# Patient Record
Sex: MALE | Race: WHITE | NOT HISPANIC OR LATINO | URBAN - METROPOLITAN AREA
[De-identification: names, ages, dates, MRNs, and addresses within clinical notes are randomized per-mention and may not be internally consistent; named-entity substitution may affect disease eponyms.]

---

## 2019-01-13 ENCOUNTER — HOSPITAL ENCOUNTER (EMERGENCY)
Facility: CLINIC | Age: 29
Discharge: HOME | End: 2019-01-13
Attending: EMERGENCY MEDICINE | Admitting: EMERGENCY MEDICINE
Payer: OTHER MISCELLANEOUS

## 2019-01-13 VITALS
TEMPERATURE: 97.8 F | RESPIRATION RATE: 16 BRPM | OXYGEN SATURATION: 97 % | SYSTOLIC BLOOD PRESSURE: 150 MMHG | DIASTOLIC BLOOD PRESSURE: 100 MMHG | WEIGHT: 280 LBS | BODY MASS INDEX: 34.82 KG/M2 | HEIGHT: 75 IN

## 2019-01-13 DIAGNOSIS — S61.213A LACERATION OF LEFT MIDDLE FINGER WITHOUT FOREIGN BODY WITHOUT DAMAGE TO NAIL, INITIAL ENCOUNTER: ICD-10-CM

## 2019-01-13 PROCEDURE — 99283 EMERGENCY DEPT VISIT LOW MDM: CPT | Mod: 25

## 2019-01-13 PROCEDURE — 12001 RPR S/N/AX/GEN/TRNK 2.5CM/<: CPT

## 2019-01-13 PROCEDURE — 25000128 H RX IP 250 OP 636: Performed by: EMERGENCY MEDICINE

## 2019-01-13 PROCEDURE — 90471 IMMUNIZATION ADMIN: CPT

## 2019-01-13 PROCEDURE — 90715 TDAP VACCINE 7 YRS/> IM: CPT | Performed by: EMERGENCY MEDICINE

## 2019-01-13 RX ADMIN — CLOSTRIDIUM TETANI TOXOID ANTIGEN (FORMALDEHYDE INACTIVATED), CORYNEBACTERIUM DIPHTHERIAE TOXOID ANTIGEN (FORMALDEHYDE INACTIVATED), BORDETELLA PERTUSSIS TOXOID ANTIGEN (GLUTARALDEHYDE INACTIVATED), BORDETELLA PERTUSSIS FILAMENTOUS HEMAGGLUTININ ANTIGEN (FORMALDEHYDE INACTIVATED), BORDETELLA PERTUSSIS PERTACTIN ANTIGEN, AND BORDETELLA PERTUSSIS FIMBRIAE 2/3 ANTIGEN 0.5 ML: 5; 2; 2.5; 5; 3; 5 INJECTION, SUSPENSION INTRAMUSCULAR at 11:11

## 2019-01-13 ASSESSMENT — ENCOUNTER SYMPTOMS
NUMBNESS: 0
WOUND: 1

## 2019-01-13 ASSESSMENT — MIFFLIN-ST. JEOR: SCORE: 2330.7

## 2019-01-13 NOTE — ED PROVIDER NOTES
"  History     Chief Complaint:  Laceration    HPI   Paul Mendoza is a 28 year old male who presents with a finger laceration. The patient was at work cutting [employer] when he cut his left middle finger with a knife. He is right handed. He states that he has sensation and normal function of the finger. He denies any other injuries. His last tetanus was in 2009.     Allergies:  No known drug allergies.    Medications:    The patient is currently on no regular medications.     Past Medical History:    History reviewed.  No significant past medical history.     Past Surgical History:    History reviewed. No pertinent past surgical history.    Family History:    History reviewed. No pertinent family history.    Social History:  Presents to ED alone  PCP: No primary care provider on file.     Review of Systems   Skin: Positive for wound.   Neurological: Negative for numbness.   All other systems reviewed and are negative.    Physical Exam   First Vitals:  Patient Vitals for the past 24 hrs:   BP Temp Temp src Heart Rate Resp SpO2 Height Weight   01/13/19 1058 (!) 150/100 97.8  F (36.6  C) Oral 81 16 97 % 1.905 m (6' 3\") 127 kg (280 lb)     Physical Exam  Nursing note and vitals reviewed.  Constitutional:  Oriented to person, place, and time. Cooperative.   HENT:   Nose:    Nose normal.   Mouth/Throat:   Mucous membranes are normal.   Eyes:    Conjunctivae normal and EOM are normal.      Pupils are equal, round, and reactive to light.   Neck:    Trachea normal.   Cardiovascular:  Normal rate, regular rhythm, normal heart sounds and normal pulses. No murmur heard.  Pulmonary/Chest:  Effort normal and breath sounds normal.   Abdominal:   Soft. Normal appearance and bowel sounds are normal.      There is no tenderness.      There is no rebound and no CVA tenderness.   Musculoskeletal:  Extremities atraumatic x 4.   Lymphadenopathy:  No cervical adenopathy.   Neurological:   Alert and oriented to person, place, and " time. Normal strength.      No cranial nerve deficit or sensory deficit. GCS eye subscore is 4. GCS verbal subscore is 5. GCS motor subscore is 6.   Skin:    2 cm Laceration to distal phalanx of the left middle finger on the radial aspect of the finger going up to the lateral aspect of the nail. No rash noted.   Psychiatric:   Normal mood and affect.    Emergency Department Course     Procedures:     Laceration Repair      LACERATION:  A simple clean 2 cm laceration.    LOCATION:  Distal phalanx of the left middle finger on the radial aspect of the finger going up to the lateral aspect of the nail.    FUNCTION:  Distally sensation, circulation, motor and tendon function are intact.    ANESTHESIA:  Local using 0.5% bupivacaine.    PREPARATION:  Irrigation with Normal Saline.    DEBRIDEMENT:  wound explored, no foreign body found.    CLOSURE:  Wound was closed with One Layer.  Skin closed with 6 x 5.0 Ethylon using interrupted sutures..    Interventions:  1111: Adacel 0.5mL IV    Emergency Department Course:  10:55 AM Nursing notes and vitals reviewed.  I performed an exam of the patient as documented above.   11:31 AM Findings and plan explained to the patient. Patient discharged home with instructions regarding supportive care, medications, and reasons to return. The importance of close follow-up was reviewed.     Impression & Plan      Medical Decision Making:  The patient presented with a simple finger laceration.  The wound was carefully evaluated and explored.  The laceration was closed with sutures as noted above.  There is no evidence of muscular, tendon, or bony damage with this laceration.  No signs of foreign body.  Possible complications (infection, scarring) were reviewed with the patient.  Patient is to return to the ER in 10-14 days for suture removal.  Tetanus and pertussis status updated today.    Diagnosis:    ICD-10-CM    1. 2 cm Laceration of distal phalanx of left middle finger without foreign  body without damage to nail, initial encounter S61.213A     6 sutures       Disposition:  discharged to home    I, Bradley Aasen, am serving as a scribe on 1/13/2019 at 10:55 AM to personally document services performed by Aneudy Hernandez MD based on my observations and the provider's statements to me.          Aneudy Hernandez MD  01/13/19 1345

## 2019-01-13 NOTE — ED AVS SNAPSHOT
Emergency Department  [address] [place] [id number]  Phone:  [phone]  Fax:  [phone]                                    Paul Mendoza   MRN: [id number]    Department:   Emergency Department   Date of Visit:  1/13/2019           After Visit Summary Signature Page    I have received my discharge instructions, and my questions have been answered. I have discussed any challenges I see with this plan with the nurse or doctor.    ..........................................................................................................................................  Patient/Patient Representative Signature      ..........................................................................................................................................  Patient Representative Print Name and Relationship to Patient    ..................................................               ................................................  Date                                   Time    ..........................................................................................................................................  Reviewed by Signature/Title    ...................................................              ..............................................  Date                                               Time          22EPIC Rev 08/18

## 2019-01-24 ENCOUNTER — OFFICE VISIT (OUTPATIENT)
Dept: FAMILY MEDICINE | Facility: CLINIC | Age: 29
End: 2019-01-24
Payer: OTHER MISCELLANEOUS

## 2019-01-24 VITALS
WEIGHT: 284.4 LBS | BODY MASS INDEX: 35.36 KG/M2 | SYSTOLIC BLOOD PRESSURE: 130 MMHG | DIASTOLIC BLOOD PRESSURE: 88 MMHG | HEIGHT: 75 IN | OXYGEN SATURATION: 95 % | HEART RATE: 84 BPM | TEMPERATURE: 97.1 F

## 2019-01-24 DIAGNOSIS — Z48.02 ENCOUNTER FOR REMOVAL OF SUTURES: Primary | ICD-10-CM

## 2019-01-24 PROCEDURE — 99202 OFFICE O/P NEW SF 15 MIN: CPT | Mod: 25 | Performed by: NURSE PRACTITIONER

## 2019-01-24 SDOH — HEALTH STABILITY: MENTAL HEALTH: HOW OFTEN DO YOU HAVE A DRINK CONTAINING ALCOHOL?: 2-4 TIMES A MONTH

## 2019-01-24 ASSESSMENT — MIFFLIN-ST. JEOR: SCORE: 2350.66

## 2019-01-24 NOTE — PROGRESS NOTES
"  SUBJECTIVE:   Paul Mendoza is a 28 year old male who presents to clinic today for the following health issues:        Removal of stitches of distal left 3rd finger ; no pain or redness, no pus;  Has 4 sutures remaining one fell out    Has been wearing moisture proof bandaid and getting finger wet    Got TD update in ED      Problem list and histories reviewed & adjusted, as indicated.  Additional history: as documented    There is no problem list on file for this patient.    Past Surgical History:   Procedure Laterality Date     ORTHOPEDIC SURGERY         Social History     Tobacco Use     Smoking status: Current Every Day Smoker     Smokeless tobacco: Never Used   Substance Use Topics     Alcohol use: Yes     Frequency: 2-4 times a month     No family history on file.      No current outpatient medications on file.     No Known Allergies    Reviewed and updated as needed this visit by clinical staff       Reviewed and updated as needed this visit by Provider         ROS:  Constitutional, HEENT, cardiovascular, pulmonary, gi and gu systems are negative, except as otherwise noted.    OBJECTIVE:     /88 (BP Location: Left arm, Cuff Size: Adult Large)   Pulse 84   Temp 97.1  F (36.2  C) (Oral)   Ht 1.905 m (6' 3\")   Wt 129 kg (284 lb 6.4 oz)   SpO2 95%   BMI 35.55 kg/m    Body mass index is 35.55 kg/m .  GENERAL: healthy, alert and no distress  MS: no gross musculoskeletal defects noted, no edema  SKIN: well approximated incision has appearance of chronically moist tissue, no erythema and no tenderness or fluctuance.  4 remaining sutures removed without incident and steri strips applied         Diagnostic Test Results:  none     ASSESSMENT/PLAN:       ICD-10-CM    1. Encounter for removal of sutures Z48.02      Routine cleansing instructions given     JAYME Ng [facility]    "

## 2024-07-30 ENCOUNTER — HOSPITAL ENCOUNTER (EMERGENCY)
Facility: CLINIC | Age: 34
Discharge: HOME | End: 2024-07-30
Attending: EMERGENCY MEDICINE | Admitting: EMERGENCY MEDICINE
Payer: COMMERCIAL

## 2024-07-30 ENCOUNTER — APPOINTMENT (OUTPATIENT)
Dept: GENERAL RADIOLOGY | Facility: CLINIC | Age: 34
End: 2024-07-30
Attending: EMERGENCY MEDICINE
Payer: COMMERCIAL

## 2024-07-30 VITALS
BODY MASS INDEX: 38.5 KG/M2 | SYSTOLIC BLOOD PRESSURE: 140 MMHG | HEART RATE: 76 BPM | TEMPERATURE: 97.8 F | OXYGEN SATURATION: 97 % | HEIGHT: 74 IN | DIASTOLIC BLOOD PRESSURE: 86 MMHG | WEIGHT: 300 LBS | RESPIRATION RATE: 18 BRPM

## 2024-07-30 DIAGNOSIS — R07.9 CHEST PAIN, UNSPECIFIED TYPE: ICD-10-CM

## 2024-07-30 LAB
ANION GAP SERPL CALCULATED.3IONS-SCNC: 10 MMOL/L (ref 7–15)
BASOPHILS # BLD AUTO: 0.1 10E3/UL (ref 0–0.2)
BASOPHILS NFR BLD AUTO: 1 %
BUN SERPL-MCNC: 13.8 MG/DL (ref 6–20)
CALCIUM SERPL-MCNC: 9.6 MG/DL (ref 8.8–10.4)
CHLORIDE SERPL-SCNC: 105 MMOL/L (ref 98–107)
CREAT SERPL-MCNC: 0.99 MG/DL (ref 0.67–1.17)
EGFRCR SERPLBLD CKD-EPI 2021: >90 ML/MIN/1.73M2
EOSINOPHIL # BLD AUTO: 0.3 10E3/UL (ref 0–0.7)
EOSINOPHIL NFR BLD AUTO: 4 %
ERYTHROCYTE [DISTWIDTH] IN BLOOD BY AUTOMATED COUNT: 12 % (ref 10–15)
GLUCOSE SERPL-MCNC: 158 MG/DL (ref 70–99)
HCO3 SERPL-SCNC: 24 MMOL/L (ref 22–29)
HCT VFR BLD AUTO: 46.1 % (ref 40–53)
HGB BLD-MCNC: 16.3 G/DL (ref 13.3–17.7)
HOLD SPECIMEN: NORMAL
IMM GRANULOCYTES # BLD: 0 10E3/UL
IMM GRANULOCYTES NFR BLD: 0 %
LYMPHOCYTES # BLD AUTO: 2.8 10E3/UL (ref 0.8–5.3)
LYMPHOCYTES NFR BLD AUTO: 35 %
MCH RBC QN AUTO: 30.9 PG (ref 26.5–33)
MCHC RBC AUTO-ENTMCNC: 35.4 G/DL (ref 31.5–36.5)
MCV RBC AUTO: 87 FL (ref 78–100)
MONOCYTES # BLD AUTO: 0.4 10E3/UL (ref 0–1.3)
MONOCYTES NFR BLD AUTO: 4 %
NEUTROPHILS # BLD AUTO: 4.4 10E3/UL (ref 1.6–8.3)
NEUTROPHILS NFR BLD AUTO: 56 %
NRBC # BLD AUTO: 0 10E3/UL
NRBC BLD AUTO-RTO: 0 /100
PLATELET # BLD AUTO: 325 10E3/UL (ref 150–450)
POTASSIUM SERPL-SCNC: 3.9 MMOL/L (ref 3.4–5.3)
RBC # BLD AUTO: 5.28 10E6/UL (ref 4.4–5.9)
SODIUM SERPL-SCNC: 139 MMOL/L (ref 135–145)
TROPONIN T SERPL HS-MCNC: <6 NG/L
WBC # BLD AUTO: 7.9 10E3/UL (ref 4–11)

## 2024-07-30 PROCEDURE — 85048 AUTOMATED LEUKOCYTE COUNT: CPT | Performed by: EMERGENCY MEDICINE

## 2024-07-30 PROCEDURE — 71046 X-RAY EXAM CHEST 2 VIEWS: CPT

## 2024-07-30 PROCEDURE — 84484 ASSAY OF TROPONIN QUANT: CPT | Performed by: EMERGENCY MEDICINE

## 2024-07-30 PROCEDURE — 99285 EMERGENCY DEPT VISIT HI MDM: CPT | Mod: 25

## 2024-07-30 PROCEDURE — 36415 COLL VENOUS BLD VENIPUNCTURE: CPT | Performed by: EMERGENCY MEDICINE

## 2024-07-30 PROCEDURE — 250N000011 HC RX IP 250 OP 636: Performed by: EMERGENCY MEDICINE

## 2024-07-30 PROCEDURE — 80048 BASIC METABOLIC PNL TOTAL CA: CPT | Performed by: EMERGENCY MEDICINE

## 2024-07-30 PROCEDURE — 93005 ELECTROCARDIOGRAM TRACING: CPT

## 2024-07-30 PROCEDURE — 96374 THER/PROPH/DIAG INJ IV PUSH: CPT

## 2024-07-30 RX ORDER — KETOROLAC TROMETHAMINE 15 MG/ML
15 INJECTION, SOLUTION INTRAMUSCULAR; INTRAVENOUS ONCE
Status: COMPLETED | OUTPATIENT
Start: 2024-07-30 | End: 2024-07-30

## 2024-07-30 RX ADMIN — KETOROLAC TROMETHAMINE 15 MG: 15 INJECTION, SOLUTION INTRAMUSCULAR; INTRAVENOUS at 15:17

## 2024-07-30 ASSESSMENT — COLUMBIA-SUICIDE SEVERITY RATING SCALE - C-SSRS
2. HAVE YOU ACTUALLY HAD ANY THOUGHTS OF KILLING YOURSELF IN THE PAST MONTH?: NO
1. IN THE PAST MONTH, HAVE YOU WISHED YOU WERE DEAD OR WISHED YOU COULD GO TO SLEEP AND NOT WAKE UP?: NO
6. HAVE YOU EVER DONE ANYTHING, STARTED TO DO ANYTHING, OR PREPARED TO DO ANYTHING TO END YOUR LIFE?: NO

## 2024-07-30 ASSESSMENT — ACTIVITIES OF DAILY LIVING (ADL)
ADLS_ACUITY_SCORE: 35
ADLS_ACUITY_SCORE: 35

## 2024-07-30 NOTE — ED PROVIDER NOTES
"  Emergency Department Note      History of Present Illness     Chief Complaint  Chest Pain    HPI  Paul Wheeler is a 33 year old male who presents with chest pain. Patient notes the onset of his chest pain as last night at 2100. He describes his pain as a pinching, sinking feeling that sustained for the entire night. Paul states that he went to bed and woke up with no pain until he stood up. He endorses a twinge sensation in his left quadriceps muscle. He denies a history of chest tightness with exercise. He denies a history of cardiovascular issues. He denies a history of pulmonary issues including pulmonary embolism or significant pneumonia. He denies a history of diabetes, high cholesterol. He denies arrhythmia or tachycardia but is unsure what qualifies for the sensation.    Independent Historian  Patient    Review of External Notes    Past Medical History   Medical History and Problem List  No other significant past medical history or family history.    Medications  The patient is currently on no regular medications.    Physical Exam   Patient Vitals for the past 24 hrs:   BP Temp Pulse Resp SpO2 Height Weight   07/30/24 1525 (!) 140/86 -- -- -- -- -- --   07/30/24 1331 (!) 157/93 -- -- -- -- -- --   07/30/24 1329 -- 97.8  F (36.6  C) 76 18 97 % 1.88 m (6' 2\") 136.1 kg (300 lb)     Physical Exam    General: Resting on the ED bed  Head:  The scalp, face, and head appear normal  Eyes:  The pupils are equal, round, and reactive to light    There is no nystagmus    Extraocular muscles are intact    Conjunctivae and sclerae are normal  ENT:    The nose is normal    Pinnae are normal    The oropharynx is normal  Neck:  Normal range of motion    There is no rigidity noted  CV:  Regular rate  Normal underlying rhythm     Normal S1/S2    No pathological murmur detected  Resp:  Lungs are clear    There is no tachypnea    Non-labored    No rales    No wheezing   GI:  Abdomen is soft, there is no rigidity    No " distension/tympani    No rebound tenderness     Non-surgical without peritoneal features at this time  MS:  Normal muscular tone    Symmetric motor strength    No asymmetric leg swelling, no calf tenderness  Skin:  No acute skin lesions noted  Neuro:  Speech is normal and fluent  There is no aphasia    No focal motor deficits    Cranial nerves are intact  Psych: Awake. Alert    Normal affect  Appropriate interactions    Diagnostics   Lab Results   Labs Ordered and Resulted from Time of ED Arrival to Time of ED Departure   BASIC METABOLIC PANEL - Abnormal       Result Value    Sodium 139      Potassium 3.9      Chloride 105      Carbon Dioxide (CO2) 24      Anion Gap 10      Urea Nitrogen 13.8      Creatinine 0.99      GFR Estimate >90      Calcium 9.6      Glucose 158 (*)    TROPONIN T, HIGH SENSITIVITY - Normal    Troponin T, High Sensitivity <6     CBC WITH PLATELETS AND DIFFERENTIAL    WBC Count 7.9      RBC Count 5.28      Hemoglobin 16.3      Hematocrit 46.1      MCV 87      MCH 30.9      MCHC 35.4      RDW 12.0      Platelet Count 325      % Neutrophils 56      % Lymphocytes 35      % Monocytes 4      % Eosinophils 4      % Basophils 1      % Immature Granulocytes 0      NRBCs per 100 WBC 0      Absolute Neutrophils 4.4      Absolute Lymphocytes 2.8      Absolute Monocytes 0.4      Absolute Eosinophils 0.3      Absolute Basophils 0.1      Absolute Immature Granulocytes 0.0      Absolute NRBCs 0.0         Imaging  XR Chest 2 Views   Final Result   IMPRESSION: No focal consolidation, pleural effusion or pneumothorax.   Cardiomediastinal silhouette is unremarkable.      GIL COLLAZO MD            SYSTEM ID:  [id number]          EKG   No results found for this or any previous visit.    Independent Interpretation by Dr. Casey  Chest X-ray indicated no acute infiltrate  ED Course    Medications Administered  Medications   ketorolac (TORADOL) injection 15 mg (15 mg Intravenous $Given 7/30/24 0996)  "      Procedures  Procedures     Discussion of Management      Social Determinants of Health adding to complexity of care  None    ED Course  ED Course as of 07/30/24 1554   Tue Jul 30, 2024   1334 I obtained history and examined the patient as noted above     1457 I rechecked the patient and explained findings.       Medical Decision Making / Diagnosis   CMS Diagnoses: None    MIPS     None    MDM  Paul Wheeler is a 33 year old male presents to the emergency department with chest discomfort as noted above.  He describes this as a \"sinking sensation\" not necessarily a poking pain, stabbing pain, and there is no pressure or heaviness.  No shortness of breath.  No PE risk factors.  No prior cardiac disease.  Patient underwent screening laboratories in the emergency department which are negative.  His troponin is undetectable.  Chest x-ray is normal.  EKG is normal.  There is a broad differential diagnosis for chest pain which includes acute coronary syndrome, pneumonia, PE, dissection, musculoskeletal etiologies, reflux amongst others.  No life-threatening etiologies were detected during this visit.  Patient felt reassured and is prepared for outpatient follow-up as needed.    Disposition  The patient was discharged.     ICD-10 Codes:    ICD-10-CM    1. Chest pain, unspecified type  R07.9            Discharge Medications  There are no discharge medications for this patient.        Scribe Disclosure:  I, Paras Garcia, am serving as a scribe at 1:50 PM on 7/30/2024 to document services personally performed by Gurpreet Casey MD based on my observations and the provider's statements to me.        Gurpreet Casey MD  07/30/24 0416    "

## 2024-07-30 NOTE — ED TRIAGE NOTES
Patient states that last night, he was just relaxing and started getting L sided chest pain with SOB. Then today, now having pressure in chest and L arm and L jaw pain.      Triage Assessment (Adult)       Row Name 07/30/24 1330          Respiratory WDL    Respiratory WDL X;rhythm/pattern     Rhythm/Pattern, Respiratory shortness of breath        Cardiac WDL    Cardiac WDL X;chest pain        Chest Pain Assessment    Chest Pain Location anterior chest, left;arm, left;jaw, left        Cognitive/Neuro/Behavioral WDL    Cognitive/Neuro/Behavioral WDL WDL

## 2024-07-31 LAB
ATRIAL RATE - MUSE: 68 BPM
DIASTOLIC BLOOD PRESSURE - MUSE: NORMAL MMHG
INTERPRETATION ECG - MUSE: NORMAL
P AXIS - MUSE: 40 DEGREES
PR INTERVAL - MUSE: 144 MS
QRS DURATION - MUSE: 88 MS
QT - MUSE: 392 MS
QTC - MUSE: 416 MS
R AXIS - MUSE: 7 DEGREES
SYSTOLIC BLOOD PRESSURE - MUSE: NORMAL MMHG
T AXIS - MUSE: 44 DEGREES
VENTRICULAR RATE- MUSE: 68 BPM